# Patient Record
Sex: MALE | Race: WHITE | Employment: OTHER | URBAN - METROPOLITAN AREA
[De-identification: names, ages, dates, MRNs, and addresses within clinical notes are randomized per-mention and may not be internally consistent; named-entity substitution may affect disease eponyms.]

---

## 2022-03-22 PROBLEM — N20.0 KIDNEY STONES: Status: ACTIVE | Noted: 2022-03-22

## 2022-03-22 PROBLEM — R97.20 ELEVATED PSA: Status: ACTIVE | Noted: 2022-03-22

## 2024-10-03 ENCOUNTER — OFFICE VISIT (OUTPATIENT)
Dept: OTOLARYNGOLOGY | Facility: CLINIC | Age: 69
End: 2024-10-03

## 2024-10-03 VITALS — WEIGHT: 251 LBS | BODY MASS INDEX: 35 KG/M2

## 2024-10-03 DIAGNOSIS — H61.21 IMPACTED CERUMEN, RIGHT EAR: Primary | ICD-10-CM

## 2024-10-03 DIAGNOSIS — H60.501 ACUTE OTITIS EXTERNA OF RIGHT EAR, UNSPECIFIED TYPE: ICD-10-CM

## 2024-10-03 PROCEDURE — 69210 REMOVE IMPACTED EAR WAX UNI: CPT | Performed by: STUDENT IN AN ORGANIZED HEALTH CARE EDUCATION/TRAINING PROGRAM

## 2024-10-03 PROCEDURE — 99203 OFFICE O/P NEW LOW 30 MIN: CPT | Performed by: STUDENT IN AN ORGANIZED HEALTH CARE EDUCATION/TRAINING PROGRAM

## 2024-10-03 RX ORDER — OFLOXACIN 3 MG/ML
SOLUTION/ DROPS OPHTHALMIC
Qty: 5 ML | Refills: 0 | Status: SHIPPED | OUTPATIENT
Start: 2024-10-03

## 2024-10-03 NOTE — PROGRESS NOTES
Gino Price Jr. is a 69 year old male.   Chief Complaint   Patient presents with    Ear Problem     Patient is here for right ear fullness after swimming reports feeling something inside of  the ears x 3 weeks ago     HPI:   69-year-old presents with a clogged right ear as well as some discomfort especially when water gets in his ear    Current Outpatient Medications   Medication Sig Dispense Refill    ofloxacin 0.3 % Ophthalmic Solution Apply to affected ear 5 drops twice a day x 5 days 5 mL 0    rosuvastatin 5 MG Oral Tab Take 1 tablet (5 mg total) by mouth daily.      metFORMIN  MG Oral Tablet 24 Hr Take 1 tablet (750 mg total) by mouth daily.      baclofen 10 MG Oral Tab Take 2 tablets by mouth four times daily as needed for Muscle Spasms for up to 30 days.      gabapentin 300 MG Oral Cap TAKE THREE CAPSULES BY MOUTH DAILY, ONE IN THE MORNING AND 2 IN THE EVENING      glimepiride 1 MG Oral Tab Take 1 tablet (1 mg total) by mouth daily.      CONTOUR TEST In Vitro Strip USE TO TEST BLOOD GLUCOSE ONCE A DAY (NIDDM, E11.9)      lisinopril 40 MG Oral Tab Take 1 tablet (40 mg total) by mouth every evening.      POTASSIUM CITRATE 10 MEQ (1080 MG) Oral Tab CR TAKE ONE TABLET BY MOUTH TWICE DAILY WITH MEALS 180 tablet 0      History reviewed. No pertinent past medical history.   Social History:  Social History     Socioeconomic History    Marital status:    Tobacco Use    Smoking status: Never    Smokeless tobacco: Never     Social Determinants of Health      Received from [facility]    Social Connections    Received from [facility]    Housing Stability      Past Surgical History:   Procedure Laterality Date    Hernia surgery  02/2023         EXAM:   Wt 251 lb (113.9 kg)   BMI 35.01 kg/m²     System Details   Skin Inspection - Normal.   Constitutional Overall appearance - Normal.   Head/Face Symmetric, TMJ tenderness not present    Eyes EOMI, PERRL   Right ear:   Canal with cerumen and wet debris, TM intact, no JOSE   Left ear:  Canal clear, TM intact, no JOSE   Nose: Septum midline, inferior turbinates not enlarged, nasal valves without collapse    Oral cavity/Oropharynx: No lesions or masses on inspection or palpation, tonsils symmetric    Neck: Soft without LAD, thyroid not enlarged  Voice clear/ no stridor   Other:      SCOPES AND PROCEDURES:     Canals:  Right: Canal with cerumen preventing adequate view of TM, debrided with instrumentation    Tympanic Membranes:  Right: Normal tympanic membrane.     TM Visualized Method:   Right TM examined via otomicroscopy.      PROCEDURE:   Removal of cerumen impaction   The cerumen impaction was completely removed on the right side using microscopy as necessary.   Removal was completed by using a curette and suction.     AUDIOGRAM AND IMAGING:         IMPRESSION:   1. Impacted cerumen, right ear    2. Acute otitis externa of right ear, unspecified type       Recommendations:  -Cerumen impaction on the right with associated otitis externa to mild degree from the water exposure  -Debrided and will place on ofloxacin drops and discussed dry ear and wax precautions    The patient indicates understanding of these issues and agrees to the plan.      Gunner Echeverria MD  10/3/2024  2:07 PM